# Patient Record
Sex: FEMALE | Race: OTHER | Employment: UNEMPLOYED | URBAN - METROPOLITAN AREA
[De-identification: names, ages, dates, MRNs, and addresses within clinical notes are randomized per-mention and may not be internally consistent; named-entity substitution may affect disease eponyms.]

---

## 2023-03-21 ENCOUNTER — TELEPHONE (OUTPATIENT)
Dept: ORTHOPEDIC SURGERY | Age: 33
End: 2023-03-21

## 2023-03-21 ENCOUNTER — HOSPITAL ENCOUNTER (EMERGENCY)
Age: 33
Discharge: HOME | End: 2023-03-21
Attending: EMERGENCY MEDICINE

## 2023-03-21 ENCOUNTER — APPOINTMENT (OUTPATIENT)
Dept: GENERAL RADIOLOGY | Age: 33
End: 2023-03-21

## 2023-03-21 VITALS
SYSTOLIC BLOOD PRESSURE: 113 MMHG | HEART RATE: 77 BPM | DIASTOLIC BLOOD PRESSURE: 70 MMHG | BODY MASS INDEX: 26.58 KG/M2 | RESPIRATION RATE: 16 BRPM | TEMPERATURE: 98.7 F | WEIGHT: 150 LBS | HEIGHT: 63 IN | OXYGEN SATURATION: 99 %

## 2023-03-21 DIAGNOSIS — S82.002A CLOSED NONDISPLACED FRACTURE OF LEFT PATELLA, UNSPECIFIED FRACTURE MORPHOLOGY, INITIAL ENCOUNTER: ICD-10-CM

## 2023-03-21 DIAGNOSIS — M25.00 LIPOHEMARTHROSIS: ICD-10-CM

## 2023-03-21 DIAGNOSIS — S83.005A CLOSED DISLOCATION OF LEFT PATELLA, INITIAL ENCOUNTER: Primary | ICD-10-CM

## 2023-03-21 PROCEDURE — 6360000002 HC RX W HCPCS: Performed by: STUDENT IN AN ORGANIZED HEALTH CARE EDUCATION/TRAINING PROGRAM

## 2023-03-21 PROCEDURE — 6370000000 HC RX 637 (ALT 250 FOR IP): Performed by: STUDENT IN AN ORGANIZED HEALTH CARE EDUCATION/TRAINING PROGRAM

## 2023-03-21 PROCEDURE — 73560 X-RAY EXAM OF KNEE 1 OR 2: CPT

## 2023-03-21 PROCEDURE — 96372 THER/PROPH/DIAG INJ SC/IM: CPT

## 2023-03-21 PROCEDURE — 73562 X-RAY EXAM OF KNEE 3: CPT

## 2023-03-21 PROCEDURE — 99284 EMERGENCY DEPT VISIT MOD MDM: CPT

## 2023-03-21 RX ORDER — MORPHINE SULFATE 4 MG/ML
4 INJECTION INTRAVENOUS ONCE
Status: COMPLETED | OUTPATIENT
Start: 2023-03-21 | End: 2023-03-21

## 2023-03-21 RX ORDER — TRAMADOL HYDROCHLORIDE 50 MG/1
50 TABLET ORAL EVERY 6 HOURS PRN
Qty: 12 TABLET | Refills: 0 | Status: SHIPPED | OUTPATIENT
Start: 2023-03-21 | End: 2023-03-24

## 2023-03-21 RX ORDER — ONDANSETRON 4 MG/1
4 TABLET, ORALLY DISINTEGRATING ORAL
Status: COMPLETED | OUTPATIENT
Start: 2023-03-21 | End: 2023-03-21

## 2023-03-21 RX ADMIN — MORPHINE SULFATE 4 MG: 4 INJECTION, SOLUTION INTRAMUSCULAR; INTRAVENOUS at 11:59

## 2023-03-21 RX ADMIN — ONDANSETRON 4 MG: 4 TABLET, ORALLY DISINTEGRATING ORAL at 12:00

## 2023-03-21 ASSESSMENT — ENCOUNTER SYMPTOMS
FACIAL SWELLING: 0
DIARRHEA: 0
CHEST TIGHTNESS: 0
WHEEZING: 0
PHOTOPHOBIA: 0
ABDOMINAL PAIN: 0
EYE REDNESS: 0
EYE PAIN: 0
RHINORRHEA: 0
SHORTNESS OF BREATH: 0
SORE THROAT: 0
APNEA: 0
VOICE CHANGE: 0
COUGH: 0
VOMITING: 0
TROUBLE SWALLOWING: 0
EYE DISCHARGE: 0

## 2023-03-21 ASSESSMENT — PAIN DESCRIPTION - ORIENTATION
ORIENTATION: LEFT
ORIENTATION: LEFT

## 2023-03-21 ASSESSMENT — PAIN SCALES - GENERAL
PAINLEVEL_OUTOF10: 10
PAINLEVEL_OUTOF10: 3

## 2023-03-21 ASSESSMENT — PAIN DESCRIPTION - LOCATION
LOCATION: KNEE
LOCATION: KNEE

## 2023-03-21 NOTE — ED PROVIDER NOTES
Medications    No medications on file        Results for orders placed or performed during the hospital encounter of 03/21/23   XR KNEE LEFT (3 VIEWS)    Narrative    Left knee    Clinical location: Left knee pain after a patella dislocation injury    COMPARISON: AP radiographs of the contralateral knee. FINDINGS: Three views of the left knee show superior lateral subluxation of  patella with respect to the trochlea. A fracture of the inferior medial corner  of the patella is evident. This would be an unusual location for a bipartite  patella. The distal femur, proximal tibia and fibula are intact. There is a  joint effusion with a fat fluid level concerning for lipohemarthrosis. Impression    1. Superior lateral subluxation of patella with respect to the trochlea with an  inferior medial corner patella fracture suspected. 2.  Joint effusion with a fat fluid level. This is concerning for a  lipohemarthrosis. XR Knee Bilateral Limited    Narrative    Left knee    Clinical location: Left knee pain after a patella dislocation injury    COMPARISON: AP radiographs of the contralateral knee. FINDINGS: Three views of the left knee show superior lateral subluxation of  patella with respect to the trochlea. A fracture of the inferior medial corner  of the patella is evident. This would be an unusual location for a bipartite  patella. The distal femur, proximal tibia and fibula are intact. There is a  joint effusion with a fat fluid level concerning for lipohemarthrosis. Impression    1. Superior lateral subluxation of patella with respect to the trochlea with an  inferior medial corner patella fracture suspected. 2.  Joint effusion with a fat fluid level. This is concerning for a  lipohemarthrosis. XR Knee Bilateral Limited   Final Result   1. Superior lateral subluxation of patella with respect to the trochlea with an   inferior medial corner patella fracture suspected.    2.  Joint

## 2023-03-21 NOTE — Clinical Note
Rajesh Galeana was seen and treated in our emergency department on 3/21/2023. She may return to work on 03/27/2023. Have this patient return on light duty until her knee heals. If you have any questions or concerns, please don't hesitate to call.       Levi Mcintyre

## 2023-03-21 NOTE — ED TRIAGE NOTES
Patient reports slipping and hearing a \"pop\" of left knee. Patient is not able to straighten  left leg at this time.

## 2023-03-21 NOTE — DISCHARGE INSTRUCTIONS
You dislocated your kneecap today. We were able to put it back into place. It does not appear you have any broken bones. Unfortunately, it is possible and likely that you tore your tendon as the knee keeps slipping out. You should follow-up with orthopedics Dr. Collin Horn. Call the number above. Return here for new or worsening symptoms. Risk of opioid analgesics include, but are not limited to: Overdosing can stop or slow your breathing and lead to death; fractures from falls; drowsiness leading to injury; tolerance, dependence and addiction. You should not operate any motorized vehicles or work from a height greater than ground level when taking opioid analgesics as they increase your fall risks. Do not combine these medications with any other opioid, to include street opioids such as heroin. Do not combine these medications with benzodiazepines like Xanax or alcohol as this may cause severe respiratory depression and death. As we discussed, I did not find a life threatening cause of your symptoms today. However, THAT DOES NOT MEAN IT COULD NOT DEVELOP. If you develop ANY new or worsening symptoms, it is critical that you return for re-evaluation. This includes any symptoms that are concerning to you, especially symptoms such as fever, numbness, inability to bear weight. If you do not return for re-evaluation, you risk serious complications, including death.

## 2023-03-21 NOTE — TELEPHONE ENCOUNTER
Pt was seen at [facility] ER for left patella dislocation. She was told to see Dr. Shanthi grimm. Can she be worked in this week?

## 2023-03-22 ENCOUNTER — OFFICE VISIT (OUTPATIENT)
Dept: ORTHOPEDIC SURGERY | Age: 33
End: 2023-03-22

## 2023-03-22 DIAGNOSIS — M25.562 ACUTE PAIN OF LEFT KNEE: Primary | ICD-10-CM

## 2023-03-22 DIAGNOSIS — M25.362 PATELLAR INSTABILITY OF LEFT KNEE: ICD-10-CM

## 2023-03-22 PROCEDURE — 99204 OFFICE O/P NEW MOD 45 MIN: CPT | Performed by: SPECIALIST/TECHNOLOGIST

## 2023-03-28 ENCOUNTER — OFFICE VISIT (OUTPATIENT)
Dept: ORTHOPEDIC SURGERY | Age: 33
End: 2023-03-28

## 2023-03-28 DIAGNOSIS — S82.092A OTHER CLOSED FRACTURE OF LEFT PATELLA, INITIAL ENCOUNTER: ICD-10-CM

## 2023-03-28 DIAGNOSIS — M25.362 PATELLAR INSTABILITY OF LEFT KNEE: Primary | ICD-10-CM

## 2023-03-28 NOTE — H&P (VIEW-ONLY)
possibility of recurrent patellar instability. We discussed operative intervention which would include knee arthroscopy possible lateral release vs open lateral lengthening and MPFL repair vs reconstruction with hamstring autograft. We also discussed treatment of any intra-articular or chondral pathology at the time of surgery as indicated. We also discussed the risks of the procedure including but not limited to infection, bleeding, anesthetic complications postoperative DVT/PE, possible need for further surgery and expected recovery of 5 to 6 months. All of their questions have been answered and they elect to proceed as planned               Levon Garcia MD, [address] [place] [address] and Sports Medicine

## 2023-03-28 NOTE — PROGRESS NOTES
The brace was properly aligned medially and laterally along the length of the left Knee with the extension/flexion dials placed slightly above the patella (to insure that if brace slides down slightly the dials will still line up on either side of the patella/knee region). The brace is extended/shorten to the patient's leg length and to insure proper fit of the brace. The straps are tightened starting with the most distal strap and then strap proximal to the patella. The strap right below the patella is then secured and last is the strap highest on the quad. The straps are then trimmed for easier tightening of the brace for the patient. Patient read and signed documenting they understand and agree to [facility]'s current DME return policy.
easily  CV: regular rhythm by pulse  Abd: soft  Left Knee: Large effusion present. Tenderness to palpation inferior pole of patella. Passive flexion to about 30 degrees with full extension. Unable to perform a straight leg raise. Positive patellar apprehension test.  Tenderness palpation over the medial aspect of the patella. Imaging:   I reviewed injury radiographs as well as postreduction plain radiographs in the emergency room which show lateral patellar dislocation with reduction there is an inferior medial pole patella fracture which appears to be displaced. Also reviewed an MRI scan of her knee which demonstrates a displaced inferior medial patella fracture with persistent lateral subluxation and increased lateral tilt of the patella. 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 Large effusion. All imaging interpreted by myself Levon Hernandez MD independent of radiology review        Assessment:     ICD-10-CM    1. Patellar instability of left knee  M25.362 [facility] [place] [address] [place] [address]     Ambulatory referral to Orthopedic Surgery     Ambulatory referral to Physical Therapy      2. Other closed fracture of left patella, initial encounter  S82.092A           Plan:   Traumatic patellar instability with a fracture and persistent subluxation. Unfortunately I think my recommendation would be for surgical intervention. We discussed potential excision versus ORIF of the patella fracture depending on the location and how much articular cartilage is involved. We also discussed an MPFL repair with internal brace versus potential reconstruction with hamstring autograft. As well as lateral lengthening. We discussed her anatomy and my concern with her trochlear dysplasia we discussed potential trochlear plasty in the future I would not do this in the acute setting.   We discussed the postoperative course associated with this and the risk and benefits including the

## 2023-03-29 ENCOUNTER — ANESTHESIA EVENT (OUTPATIENT)
Dept: SURGERY | Age: 33
End: 2023-03-29

## 2023-03-29 DIAGNOSIS — M25.562 ACUTE PAIN OF LEFT KNEE: ICD-10-CM

## 2023-03-29 DIAGNOSIS — M25.362 PATELLAR INSTABILITY OF LEFT KNEE: Primary | ICD-10-CM

## 2023-03-29 DIAGNOSIS — S82.092A OTHER CLOSED FRACTURE OF LEFT PATELLA, INITIAL ENCOUNTER: ICD-10-CM

## 2023-03-29 RX ORDER — CHLORAL HYDRATE 500 MG
CAPSULE ORAL DAILY
COMMUNITY

## 2023-03-29 RX ORDER — TRAMADOL HYDROCHLORIDE 50 MG/1
50 TABLET ORAL EVERY 6 HOURS PRN
COMMUNITY

## 2023-03-29 RX ORDER — OXYCODONE HYDROCHLORIDE 5 MG/1
5 TABLET ORAL EVERY 4 HOURS PRN
Qty: 30 TABLET | Refills: 0 | Status: SHIPPED | OUTPATIENT
Start: 2023-03-29 | End: 2023-04-03

## 2023-03-29 RX ORDER — SODIUM CHLORIDE 9 MG/ML
INJECTION, SOLUTION INTRAVENOUS PRN
Status: CANCELLED | OUTPATIENT
Start: 2023-03-29

## 2023-03-29 NOTE — PERIOP NOTE
Patient verified name and . Order for consent not found in EHR and matches case posting; patient verifies procedure. Type II surgery, phone assessment complete. Orders not received. Labs per surgeon: none  Labs per anesthesia protocol: hgb (s/h for DOS)    Patient answered medical/surgical history questions at their best of ability. All prior to admission medications documented in EPIC. Patient instructed to take the following medications the day of surgery according to anesthesia guidelines with a small sip of water: Tramadol (Ultram) if needed  On the day before surgery please take Acetaminophen 1000mg in the morning and then again before bed. You may substitute for Tylenol 650 mg. Hold all vitamins 7 days prior to surgery and NSAIDS 5 days prior to surgery. Prescription meds to hold: none  Patient instructed on the following:    > Arrive at [address] [facility], time of arrival to be called the day before by 1700  > NPO after midnight, unless otherwise indicated, including gum, mints, and ice chips  > Responsible adult must drive patient to the hospital, stay during surgery, and patient will need supervision 24 hours after anesthesia  > Use antibacterial soap in shower the night before surgery and on the morning of surgery  > All piercings must be removed prior to arrival.    > Leave all valuables (money and jewelry) at home but bring insurance card and ID on DOS.   > You may be required to pay a deductible or co-pay on the day of your procedure. You can pre-pay by calling [phone] if your surgery is at the [facility] or [phone] if your surgery is at the [facility]. > Do not wear make-up, nail polish, lotions, cologne, perfumes, powders, or oil on skin. Artificial nails are not permitted.

## 2023-03-29 NOTE — PERIOP NOTE
Preop department called to notify patient of arrival time for scheduled procedure. Instructions given to   - Arrive at [address]. - Remain NPO after midnight, unless otherwise indicated, including gum, mints, and ice chips. - Have a responsible adult to drive patient to the hospital, stay during surgery, and patient will need supervision 24 hours after anesthesia. - Use antibacterial soap in shower the night before surgery and on the morning of surgery.        Was patient contacted: margarito  Voicemail left:   Numbers contacted: [phone]   Arrival time: 1000

## 2023-03-30 ENCOUNTER — OFFICE VISIT (OUTPATIENT)
Dept: ORTHOPEDIC SURGERY | Age: 33
End: 2023-03-30

## 2023-03-30 ENCOUNTER — HOSPITAL ENCOUNTER (OUTPATIENT)
Age: 33
Setting detail: OUTPATIENT SURGERY
Discharge: HOME | End: 2023-03-30
Attending: ORTHOPAEDIC SURGERY | Admitting: ORTHOPAEDIC SURGERY

## 2023-03-30 ENCOUNTER — ANESTHESIA (OUTPATIENT)
Dept: SURGERY | Age: 33
End: 2023-03-30

## 2023-03-30 ENCOUNTER — APPOINTMENT (OUTPATIENT)
Dept: GENERAL RADIOLOGY | Age: 33
End: 2023-03-30
Attending: ORTHOPAEDIC SURGERY

## 2023-03-30 VITALS
TEMPERATURE: 98.2 F | OXYGEN SATURATION: 96 % | WEIGHT: 150 LBS | HEIGHT: 63 IN | RESPIRATION RATE: 16 BRPM | SYSTOLIC BLOOD PRESSURE: 151 MMHG | DIASTOLIC BLOOD PRESSURE: 73 MMHG | BODY MASS INDEX: 26.58 KG/M2 | HEART RATE: 92 BPM

## 2023-03-30 DIAGNOSIS — M25.362 PATELLAR INSTABILITY OF LEFT KNEE: ICD-10-CM

## 2023-03-30 DIAGNOSIS — M25.362 PATELLAR INSTABILITY OF LEFT KNEE: Primary | ICD-10-CM

## 2023-03-30 DIAGNOSIS — M25.562 ACUTE PAIN OF LEFT KNEE: ICD-10-CM

## 2023-03-30 LAB
HCG UR QL: NEGATIVE
HCG UR QL: NEGATIVE

## 2023-03-30 PROCEDURE — C1713 ANCHOR/SCREW BN/BN,TIS/BN: HCPCS | Performed by: ORTHOPAEDIC SURGERY

## 2023-03-30 PROCEDURE — 7100000000 HC PACU RECOVERY - FIRST 15 MIN: Performed by: ORTHOPAEDIC SURGERY

## 2023-03-30 PROCEDURE — A4216 STERILE WATER/SALINE, 10 ML: HCPCS | Performed by: ORTHOPAEDIC SURGERY

## 2023-03-30 PROCEDURE — 2580000003 HC RX 258: Performed by: ANESTHESIOLOGY

## 2023-03-30 PROCEDURE — 6360000002 HC RX W HCPCS: Performed by: REGISTERED NURSE

## 2023-03-30 PROCEDURE — 6360000002 HC RX W HCPCS: Performed by: ANESTHESIOLOGY

## 2023-03-30 PROCEDURE — 6370000000 HC RX 637 (ALT 250 FOR IP): Performed by: ANESTHESIOLOGY

## 2023-03-30 PROCEDURE — 2500000003 HC RX 250 WO HCPCS: Performed by: REGISTERED NURSE

## 2023-03-30 PROCEDURE — 7100000010 HC PHASE II RECOVERY - FIRST 15 MIN: Performed by: ORTHOPAEDIC SURGERY

## 2023-03-30 PROCEDURE — 2709999900 HC NON-CHARGEABLE SUPPLY: Performed by: ORTHOPAEDIC SURGERY

## 2023-03-30 PROCEDURE — 3700000001 HC ADD 15 MINUTES (ANESTHESIA): Performed by: ORTHOPAEDIC SURGERY

## 2023-03-30 PROCEDURE — 6360000002 HC RX W HCPCS: Performed by: SPECIALIST/TECHNOLOGIST

## 2023-03-30 PROCEDURE — 3600000014 HC SURGERY LEVEL 4 ADDTL 15MIN: Performed by: ORTHOPAEDIC SURGERY

## 2023-03-30 PROCEDURE — 27524 TREAT KNEECAP FRACTURE: CPT | Performed by: ORTHOPAEDIC SURGERY

## 2023-03-30 PROCEDURE — 6360000002 HC RX W HCPCS: Performed by: ORTHOPAEDIC SURGERY

## 2023-03-30 PROCEDURE — 7100000011 HC PHASE II RECOVERY - ADDTL 15 MIN: Performed by: ORTHOPAEDIC SURGERY

## 2023-03-30 PROCEDURE — 3700000000 HC ANESTHESIA ATTENDED CARE: Performed by: ORTHOPAEDIC SURGERY

## 2023-03-30 PROCEDURE — 3600000004 HC SURGERY LEVEL 4 BASE: Performed by: ORTHOPAEDIC SURGERY

## 2023-03-30 PROCEDURE — 2720000010 HC SURG SUPPLY STERILE: Performed by: ORTHOPAEDIC SURGERY

## 2023-03-30 PROCEDURE — 27425 LAT RETINACULAR RELEASE OPEN: CPT | Performed by: ORTHOPAEDIC SURGERY

## 2023-03-30 PROCEDURE — 81025 URINE PREGNANCY TEST: CPT

## 2023-03-30 PROCEDURE — 7100000001 HC PACU RECOVERY - ADDTL 15 MIN: Performed by: ORTHOPAEDIC SURGERY

## 2023-03-30 PROCEDURE — 2580000003 HC RX 258: Performed by: ORTHOPAEDIC SURGERY

## 2023-03-30 PROCEDURE — 27420 REVISION OF UNSTABLE KNEECAP: CPT | Performed by: ORTHOPAEDIC SURGERY

## 2023-03-30 DEVICE — ANCHOR SFT TISS BICEPS FIBERTAK: Type: IMPLANTABLE DEVICE | Site: KNEE | Status: FUNCTIONAL

## 2023-03-30 DEVICE — SCREW INTRF L23MM DIA6MM 30% BIPHASIC CA PHOS AND 70% PLDLA: Type: IMPLANTABLE DEVICE | Site: KNEE | Status: FUNCTIONAL

## 2023-03-30 RX ORDER — SODIUM CHLORIDE 0.9 % (FLUSH) 0.9 %
5-40 SYRINGE (ML) INJECTION EVERY 12 HOURS SCHEDULED
Status: DISCONTINUED | OUTPATIENT
Start: 2023-03-30 | End: 2023-03-30 | Stop reason: HOSPADM

## 2023-03-30 RX ORDER — SODIUM CHLORIDE 9 MG/ML
INJECTION INTRAVENOUS PRN
Status: DISCONTINUED | OUTPATIENT
Start: 2023-03-30 | End: 2023-03-30 | Stop reason: HOSPADM

## 2023-03-30 RX ORDER — EPINEPHRINE 1 MG/ML(1)
AMPUL (ML) INJECTION PRN
Status: DISCONTINUED | OUTPATIENT
Start: 2023-03-30 | End: 2023-03-30 | Stop reason: HOSPADM

## 2023-03-30 RX ORDER — ROCURONIUM BROMIDE 10 MG/ML
INJECTION, SOLUTION INTRAVENOUS PRN
Status: DISCONTINUED | OUTPATIENT
Start: 2023-03-30 | End: 2023-03-30 | Stop reason: SDUPTHER

## 2023-03-30 RX ORDER — ONDANSETRON 2 MG/ML
INJECTION INTRAMUSCULAR; INTRAVENOUS PRN
Status: DISCONTINUED | OUTPATIENT
Start: 2023-03-30 | End: 2023-03-30 | Stop reason: SDUPTHER

## 2023-03-30 RX ORDER — ONDANSETRON 2 MG/ML
4 INJECTION INTRAMUSCULAR; INTRAVENOUS
Status: DISCONTINUED | OUTPATIENT
Start: 2023-03-30 | End: 2023-03-30 | Stop reason: HOSPADM

## 2023-03-30 RX ORDER — SODIUM CHLORIDE 0.9 % (FLUSH) 0.9 %
5-40 SYRINGE (ML) INJECTION PRN
Status: DISCONTINUED | OUTPATIENT
Start: 2023-03-30 | End: 2023-03-30 | Stop reason: HOSPADM

## 2023-03-30 RX ORDER — OXYCODONE HYDROCHLORIDE 5 MG/1
5 TABLET ORAL
Status: DISCONTINUED | OUTPATIENT
Start: 2023-03-30 | End: 2023-03-30 | Stop reason: HOSPADM

## 2023-03-30 RX ORDER — NEOSTIGMINE METHYLSULFATE 1 MG/ML
INJECTION, SOLUTION INTRAVENOUS PRN
Status: DISCONTINUED | OUTPATIENT
Start: 2023-03-30 | End: 2023-03-30 | Stop reason: SDUPTHER

## 2023-03-30 RX ORDER — HYDROMORPHONE HYDROCHLORIDE 2 MG/ML
0.5 INJECTION, SOLUTION INTRAMUSCULAR; INTRAVENOUS; SUBCUTANEOUS EVERY 5 MIN PRN
Status: DISCONTINUED | OUTPATIENT
Start: 2023-03-30 | End: 2023-03-30 | Stop reason: HOSPADM

## 2023-03-30 RX ORDER — HYDROMORPHONE HCL 110MG/55ML
PATIENT CONTROLLED ANALGESIA SYRINGE INTRAVENOUS PRN
Status: DISCONTINUED | OUTPATIENT
Start: 2023-03-30 | End: 2023-03-30 | Stop reason: SDUPTHER

## 2023-03-30 RX ORDER — KETAMINE HCL IN NACL, ISO-OSM 20 MG/2 ML
SYRINGE (ML) INJECTION PRN
Status: DISCONTINUED | OUTPATIENT
Start: 2023-03-30 | End: 2023-03-30 | Stop reason: SDUPTHER

## 2023-03-30 RX ORDER — KETOROLAC TROMETHAMINE 30 MG/ML
INJECTION, SOLUTION INTRAMUSCULAR; INTRAVENOUS PRN
Status: DISCONTINUED | OUTPATIENT
Start: 2023-03-30 | End: 2023-03-30 | Stop reason: HOSPADM

## 2023-03-30 RX ORDER — SODIUM CHLORIDE, SODIUM LACTATE, POTASSIUM CHLORIDE, CALCIUM CHLORIDE 600; 310; 30; 20 MG/100ML; MG/100ML; MG/100ML; MG/100ML
INJECTION, SOLUTION INTRAVENOUS CONTINUOUS
Status: DISCONTINUED | OUTPATIENT
Start: 2023-03-30 | End: 2023-03-30 | Stop reason: HOSPADM

## 2023-03-30 RX ORDER — PROPOFOL 10 MG/ML
INJECTION, EMULSION INTRAVENOUS PRN
Status: DISCONTINUED | OUTPATIENT
Start: 2023-03-30 | End: 2023-03-30 | Stop reason: SDUPTHER

## 2023-03-30 RX ORDER — ROPIVACAINE HYDROCHLORIDE 5 MG/ML
INJECTION, SOLUTION EPIDURAL; INFILTRATION; PERINEURAL PRN
Status: DISCONTINUED | OUTPATIENT
Start: 2023-03-30 | End: 2023-03-30 | Stop reason: HOSPADM

## 2023-03-30 RX ORDER — LIDOCAINE HYDROCHLORIDE 20 MG/ML
INJECTION, SOLUTION EPIDURAL; INFILTRATION; INTRACAUDAL; PERINEURAL PRN
Status: DISCONTINUED | OUTPATIENT
Start: 2023-03-30 | End: 2023-03-30 | Stop reason: SDUPTHER

## 2023-03-30 RX ORDER — FENTANYL CITRATE 50 UG/ML
100 INJECTION, SOLUTION INTRAMUSCULAR; INTRAVENOUS
Status: DISCONTINUED | OUTPATIENT
Start: 2023-03-30 | End: 2023-03-30 | Stop reason: HOSPADM

## 2023-03-30 RX ORDER — TRANEXAMIC ACID 100 MG/ML
INJECTION, SOLUTION INTRAVENOUS PRN
Status: DISCONTINUED | OUTPATIENT
Start: 2023-03-30 | End: 2023-03-30 | Stop reason: SDUPTHER

## 2023-03-30 RX ORDER — ACETAMINOPHEN 500 MG
1000 TABLET ORAL ONCE
Status: COMPLETED | OUTPATIENT
Start: 2023-03-30 | End: 2023-03-30

## 2023-03-30 RX ORDER — MIDAZOLAM HYDROCHLORIDE 1 MG/ML
INJECTION INTRAMUSCULAR; INTRAVENOUS PRN
Status: DISCONTINUED | OUTPATIENT
Start: 2023-03-30 | End: 2023-03-30 | Stop reason: SDUPTHER

## 2023-03-30 RX ORDER — MIDAZOLAM HYDROCHLORIDE 2 MG/2ML
2 INJECTION, SOLUTION INTRAMUSCULAR; INTRAVENOUS
Status: DISCONTINUED | OUTPATIENT
Start: 2023-03-30 | End: 2023-03-30 | Stop reason: HOSPADM

## 2023-03-30 RX ORDER — DIPHENHYDRAMINE HYDROCHLORIDE 50 MG/ML
12.5 INJECTION INTRAMUSCULAR; INTRAVENOUS
Status: DISCONTINUED | OUTPATIENT
Start: 2023-03-30 | End: 2023-03-30 | Stop reason: HOSPADM

## 2023-03-30 RX ORDER — GLYCOPYRROLATE 0.2 MG/ML
INJECTION INTRAMUSCULAR; INTRAVENOUS PRN
Status: DISCONTINUED | OUTPATIENT
Start: 2023-03-30 | End: 2023-03-30 | Stop reason: SDUPTHER

## 2023-03-30 RX ORDER — DEXAMETHASONE SODIUM PHOSPHATE 4 MG/ML
INJECTION, SOLUTION INTRA-ARTICULAR; INTRALESIONAL; INTRAMUSCULAR; INTRAVENOUS; SOFT TISSUE PRN
Status: DISCONTINUED | OUTPATIENT
Start: 2023-03-30 | End: 2023-03-30 | Stop reason: SDUPTHER

## 2023-03-30 RX ADMIN — Medication 3 MG: at 14:37

## 2023-03-30 RX ADMIN — TRANEXAMIC ACID 1000 MG: 100 INJECTION, SOLUTION INTRAVENOUS at 12:40

## 2023-03-30 RX ADMIN — HYDROMORPHONE HYDROCHLORIDE 0.6 MG: 2 INJECTION INTRAMUSCULAR; INTRAVENOUS; SUBCUTANEOUS at 12:54

## 2023-03-30 RX ADMIN — Medication 2 G: at 12:30

## 2023-03-30 RX ADMIN — Medication 30 MG: at 12:32

## 2023-03-30 RX ADMIN — ACETAMINOPHEN 1000 MG: 500 TABLET, FILM COATED ORAL at 10:41

## 2023-03-30 RX ADMIN — ONDANSETRON 4 MG: 2 INJECTION INTRAMUSCULAR; INTRAVENOUS at 12:56

## 2023-03-30 RX ADMIN — LIDOCAINE HYDROCHLORIDE 60 MG: 20 INJECTION, SOLUTION EPIDURAL; INFILTRATION; INTRACAUDAL; PERINEURAL at 12:22

## 2023-03-30 RX ADMIN — MIDAZOLAM 2 MG: 1 INJECTION INTRAMUSCULAR; INTRAVENOUS at 12:13

## 2023-03-30 RX ADMIN — FENTANYL CITRATE 50 MCG: 50 INJECTION INTRAMUSCULAR; INTRAVENOUS at 12:22

## 2023-03-30 RX ADMIN — HYDROMORPHONE HYDROCHLORIDE 0.4 MG: 2 INJECTION INTRAMUSCULAR; INTRAVENOUS; SUBCUTANEOUS at 13:12

## 2023-03-30 RX ADMIN — PROPOFOL 200 MG: 10 INJECTION, EMULSION INTRAVENOUS at 12:22

## 2023-03-30 RX ADMIN — ROCURONIUM BROMIDE 50 MG: 50 INJECTION, SOLUTION INTRAVENOUS at 12:22

## 2023-03-30 RX ADMIN — SODIUM CHLORIDE, SODIUM LACTATE, POTASSIUM CHLORIDE, AND CALCIUM CHLORIDE: 600; 310; 30; 20 INJECTION, SOLUTION INTRAVENOUS at 13:00

## 2023-03-30 RX ADMIN — DEXAMETHASONE SODIUM PHOSPHATE 4 MG: 4 INJECTION, SOLUTION INTRAMUSCULAR; INTRAVENOUS at 12:56

## 2023-03-30 RX ADMIN — Medication 15 MG: at 13:30

## 2023-03-30 RX ADMIN — SODIUM CHLORIDE, SODIUM LACTATE, POTASSIUM CHLORIDE, AND CALCIUM CHLORIDE: 600; 310; 30; 20 INJECTION, SOLUTION INTRAVENOUS at 10:41

## 2023-03-30 RX ADMIN — GLYCOPYRROLATE 0.4 MG: 0.2 INJECTION INTRAMUSCULAR; INTRAVENOUS at 14:37

## 2023-03-30 RX ADMIN — FENTANYL CITRATE 50 MCG: 50 INJECTION INTRAMUSCULAR; INTRAVENOUS at 12:32

## 2023-03-30 RX ADMIN — HYDROMORPHONE HYDROCHLORIDE 0.5 MG: 2 INJECTION, SOLUTION INTRAMUSCULAR; INTRAVENOUS; SUBCUTANEOUS at 15:20

## 2023-03-30 ASSESSMENT — PAIN DESCRIPTION - LOCATION
LOCATION: LEG
LOCATION: LEG

## 2023-03-30 ASSESSMENT — PAIN DESCRIPTION - DESCRIPTORS
DESCRIPTORS: ACHING
DESCRIPTORS: ACHING

## 2023-03-30 ASSESSMENT — PAIN DESCRIPTION - ORIENTATION
ORIENTATION: LEFT
ORIENTATION: LEFT

## 2023-03-30 ASSESSMENT — PAIN SCALES - GENERAL
PAINLEVEL_OUTOF10: 5
PAINLEVEL_OUTOF10: 7

## 2023-03-30 NOTE — DISCHARGE INSTRUCTIONS
showering. Keep bandage in place. EXERCISES: Continue Quad sets and ankle pumps as above. ICE: Continue to use the ice machine frequently as instructed above. Place a thin layer of clothing or pillow case between ice pack and your skin. Ice for 20-30 minutes on and then 20-30 minutes off. Third Post-Op Day:    MEDICATION:  Continue as instructed above. BANDAGES:   (after 72 hours/3 days): Remove outer bandages. Leave steri-strips in place. You may shower, but keep the incisions as dry as possible (cover with plastic wrap or a garbage bag over the leg). It is best to sit down in the shower to avoid slipping. Replace brace after removing the outer dressing. EXERCISES:    Continue exercises as noted above. Begin flat foot weight bearing (place foot flat on the ground and bear the weight of your leg only). The brace must be locked in extension (straight). ICE:   Continue to ice frequently eight with the ice machine or regular ice pack. Do not put it directly on your skin. Place a thin layer of clothing or pillow case between ice pack and your skin. Ice for 20-30 minutes on and then 20-30 minutes off. If you are awake and comfortable it is ok to use the ice machine more than 30 minutes at a time as long as you ensure it is not burning your skin. PHYSICAL THERAPY APPOINTMENT:  If you do not already have a PT appointment scheduled, please make an appointment for 3-7 days post-op. GENERAL INFORMATION:     KNEE RESPONSE TO SURGERY:  -Your knee and lower leg will be swollen.  -It may take 4 weeks or longer for the swelling to go away. -It is also common to notice bruising around the thigh, knee and calf. Call with any problems or questions. [phone] if you have any questions or problems. Please contact us immediately if you notice fever greater than 101 degrees F, excessive bleeding, or drainage from the surgery site, calf pain, or shortness of breath.    If you are calling tobacco products/ Avoid exposure to second hand smoke    Surgeon General's Warning:  Quitting smoking now greatly reduces serious risk to your health. Obesity, smoking, and sedentary lifestyle greatly increases your risk for illness    A healthy diet, regular physical exercise & weight monitoring are important for maintaining a healthy lifestyle    You may be retaining fluid if you have a history of heart failure or if you experience any of the following symptoms:  Weight gain of 3 pounds or more overnight or 5 pounds in a week, increased swelling in our hands or feet or shortness of breath while lying flat in bed. Please call your doctor as soon as you notice any of these symptoms; do not wait until your next office visit. Recognize signs and symptoms of STROKE:    F-face looks uneven    A-arms unable to move or move unevenly    S-speech slurred or non-existent    T-time-call 911 as soon as signs and symptoms begin-DO NOT go       Back to bed or wait to see if you get better-TIME IS BRAIN.

## 2023-03-30 NOTE — INTERVAL H&P NOTE
Update History & Physical    The Patient's History and Physical was reviewed   I discussed the surgery and patients medical condition with the patient. The chart was reviewed with the patient and I examined the patient. There was no change from previous note. The surgical site was confirmed by the patient and me. CV: RRR  RESP: CTAB    Plan:  The risk, benefits, expected outcome, and alternative to the recommended procedure have been discussed with the patient. Patient understands and elects to proceed with the procedure as planned.     Electronically signed by Lorraine Harkins MD on 03/30/23 12:05 PM

## 2023-03-30 NOTE — PROGRESS NOTES
's pre-procedure visit requested by patient. Conveyed care and concern for patient and family. Offered prayer as requested for patient, family, and staff.     Bernice Mesa MDiv, BS  Board Certified

## 2023-03-30 NOTE — ANESTHESIA PROCEDURE NOTES
Airway  Date/Time: 3/30/2023 12:26 PM  Urgency: elective    Airway not difficult    General Information and Staff    Patient location during procedure: OR  Resident/CRNA: MAGGI Alfredo CRNA  Performed: resident/CRNA     Indications and Patient Condition  Indications for airway management: anesthesia  Spontaneous Ventilation: absent  Sedation level: deep  Preoxygenated: yes  Patient position: sniffing  Mask difficulty assessment: vent by bag mask    Final Airway Details  Final airway type: endotracheal airway      Successful airway: ETT  Cuffed: yes   Successful intubation technique: direct laryngoscopy  Facilitating devices/methods: intubating stylet  Endotracheal tube insertion site: oral  Blade: Sanjay  Blade size: #4  ETT size (mm): 7.0  Cormack-Lehane Classification: grade I - full view of glottis  Placement verified by: chest auscultation and capnometry   Measured from: lips  ETT to lips (cm): 22  Number of attempts at approach: 1  Ventilation between attempts: bag mask  Number of other approaches attempted: 0    Additional Comments  PreO2 > 5 minutes. Standard IV induction by MDA. Atraumatic insertion. Positive equal and bilateral breath sounds noted with positive ETCO2 present. Lips and dentition unchanged from pre-op.     no

## 2023-03-30 NOTE — OP NOTE
postoperative pain control. Steri-Strips large bulky sterile dressings and a hinged knee brace and cryotherapy device were applied.    Carli tolerated the procedure well was awakened and transferred to the PACU in stable condition        Postoperative plan:  1) Discharge Home  2) DVT prophylaxis with aspirin 81 mg twice daily x3 weeks  3) Rehab protocol: Routine MPFL protocol, motion as tolerated, gradual progression of weightbearing as tolerated in extension    Signed By:  Jessica Wren MD     March 30, 2023

## 2023-03-30 NOTE — ANESTHESIA PRE PROCEDURE
Department of Anesthesiology  Preprocedure Note       Name:  Noelle Mathias   Age:  28 y.o.  :  1990                                          MRN:  [id number]         Date:  3/30/2023      Surgeon: Rob King):  Jaquelin Hwang MD    Procedure: Procedure(s):  LEFT KNEE MEDIAL PATELLOFEMORAL LIGAMENT RECONSTRUCTION HAMSTRING AUTOGRAFT AND OPEN REDUCTION AND INTERNAL FIXATION PATELLA    Medications prior to admission:   Prior to Admission medications    Medication Sig Start Date End Date Taking? Authorizing Provider   COLLAGEN PO Take by mouth daily 1 collagen gummy daily   Yes Historical Provider, MD   traMADol (ULTRAM) 50 MG tablet Take 50 mg by mouth every 6 hours as needed for Pain. Yes Historical Provider, MD   Omega-3 Fatty Acids (FISH OIL) 1000 MG capsule Take by mouth daily   Yes Historical Provider, MD   CHLOROPHYLL PO Take by mouth Chlorophyll liquid gtts added to 16 oz of water   Yes Historical Provider, MD   oxyCODONE (ROXICODONE) 5 MG immediate release tablet Take 1 tablet by mouth every 4 hours as needed for Pain for up to 5 days. Intended supply: 5 days.  Take lowest dose possible to manage pain Max Daily Amount: 30 mg 3/29/23 4/3/23  MAGGI Morgan CNP       Current medications:    Current Facility-Administered Medications   Medication Dose Route Frequency Provider Last Rate Last Admin    sodium chloride flush 0.9 % injection 5-40 mL  5-40 mL IntraVENous 2 times per day MAGGI Morgan CNP        sodium chloride flush 0.9 % injection 5-40 mL  5-40 mL IntraVENous PRN MAGGI Morgan CNP        ceFAZolin (ANCEF) 2000 mg in sterile water 20 mL IV syringe  2,000 mg IntraVENous On Call to [address] [place] [address], APRN - CNP        fentaNYL (SUBLIMAZE) injection 100 mcg  100 mcg IntraVENous Once PRN Harsh Fontenot MD        lactated ringers IV soln infusion   IntraVENous Continuous Harsh Fontenot  mL/hr at 23 1041 New Bag at 23 1041    sodium chloride flush

## 2023-03-30 NOTE — PROGRESS NOTES
I explained and demonstrated all information to the patient about how to properly use the machine. It will be used to help reduce pain and swelling, in turn facilitate healing and speed up the rehabilitation process. The patient was instructed on how to properly fill the machine with ice and water as well as the importance to ALWAYS use a barrier between your skin and the cold pad to avoid additional injury. The patient was instructed to take the machine to the hospital with them the morning of their surgery. Patient was advised that if they had any questions about the Ice Man Machine or how to properly use it to please call me at [phone]. Patient read and signed documenting they understand and agree to [facility]'s current DME return policy.

## 2023-03-31 NOTE — ANESTHESIA POSTPROCEDURE EVALUATION
Department of Anesthesiology  Postprocedure Note    Patient: Jim English  MRN: [id number]  YOB: 1990  Date of evaluation: 3/31/2023      Procedure Summary     Date: 03/30/23 Room / Location:  OP OR 03 / [facility]    Anesthesia Start: 1212 Anesthesia Stop: 0983    Procedure: LEFT KNEE MEDIAL PATELLOFEMORAL LIGAMENT RECONSTRUCTION HAMSTRING AUTOGRAFT AND PARTIAL PATELLECTOMY/ LATERAL RETINACULAR LENGTHENING (Left: Knee) Diagnosis:       Patellar instability of left knee      Acute pain of left knee      (Patellar instability of left knee [M25.362])      (Acute pain of left knee [M25.562])    Surgeons: Justin Nino MD Responsible Provider: Hemant Mulligan MD    Anesthesia Type: General ASA Status: 2          Anesthesia Type: General    Brent Phase I: Brent Score: 10    Brent Phase II: Brent Score: 10      Anesthesia Post Evaluation    Patient location during evaluation: PACU  Patient participation: complete - patient participated  Level of consciousness: awake and alert  Airway patency: patent  Nausea & Vomiting: no nausea and no vomiting  Complications: no  Cardiovascular status: hemodynamically stable  Respiratory status: acceptable, nonlabored ventilation and spontaneous ventilation  Hydration status: euvolemic  Comments: BP (!) 151/73   Pulse 92   Temp 98.2 °F (36.8 °C) (Skin)   Resp 16   Ht 5' 3\" (1.6 m)   Wt 150 lb (68 kg)   SpO2 96%   BMI 26.57 kg/m²     Multimodal analgesia pain management approach

## 2023-04-03 ENCOUNTER — OFFICE VISIT (OUTPATIENT)
Age: 33
End: 2023-04-03

## 2023-04-03 DIAGNOSIS — Z74.09 IMPAIRED FUNCTIONAL MOBILITY, BALANCE, GAIT, AND ENDURANCE: ICD-10-CM

## 2023-04-03 DIAGNOSIS — M25.362 PATELLAR INSTABILITY OF LEFT KNEE: ICD-10-CM

## 2023-04-03 DIAGNOSIS — M25.462 EFFUSION OF LEFT KNEE: ICD-10-CM

## 2023-04-03 DIAGNOSIS — M62.81 MUSCLE WEAKNESS: ICD-10-CM

## 2023-04-03 DIAGNOSIS — M25.662 KNEE STIFFNESS, LEFT: ICD-10-CM

## 2023-04-03 DIAGNOSIS — M25.562 ACUTE PAIN OF LEFT KNEE: Primary | ICD-10-CM

## 2023-04-03 NOTE — PROGRESS NOTES
is a 28 y.o. female who presents to therapy today with evolving/changing clinical presentation (moderate complexity)  related to L knee pain, stiffness, and marked weakness s/p MPFL repair. Pt would benefit from skilled physical therapy services to address the deficits noted above for return to prior level of function. PLAN OF CARE     Effective Dates: 4/3/2023 TO 7/2/2023 (90 days). Frequency/Duration: 2x/week for 90 Day(s)  Interventions may include but are not limited to: (00096) Therapeutic exercise to develop ROM, strength, endurance and flexibility  (53608) Therapeutic activities using dynamic activities to improve function  (89905) Gait training to address mechanics, proper step length and weight shifting to improve household and community mobility as well as overall safety with ADLs  (84891) Manual therapy techniques to improve joint and/or soft tissue mobility, ROM, and function as well as helping to decrease pain/spasms and swelling  (22117) Neuromuscular reeducation addressing impaired balance, coordination, kinesthetic sense, posture and proprioception  Home exercise program (HEP) development  Modalities prn to address pain, spasms, and swelling: (36720) Electrical stimulation - attended  (47205/) Electrical stimulation- unattended  (59336) Vasopneumatic compression  (27594) Hot/cold pack    The referring physician has reviewed and approved this evaluation and plan of care as noted by the electronic signature attached to note. GOALS     Goals:  Short term goals to be met by 5/1/2023  (4 weeks):  Pt will demonstrate good recall of HEP requiring minimal verbal cuing for proper form and technique. Pt will decrease swelling at involved LE/knee by 1 cm at mid-patella, which contributes to greater ROM and less pain with ADLs. Increase knee AROM of involved LE to 8-0-120 degrees, in order to sit more comfortably in a low chair.   Pt will improve quad set to fair for improved knee stability with

## 2023-04-05 NOTE — PROGRESS NOTES
dislocation 3/21/23. The ER had difficulty keeping the patella reduced. She was placed in a knee immobilizer, non-weight bearing with crutches and followed up with POA. MRI completed with results as noted above and pt scheduled surgery. Underwent surgery as noted above 2/48/65 with no complications to date. Describe current symptoms: Pain entire L knee, mainly concentrated in the front. Swelling L knee. Rare numbness/tingling down the leg. Patient Stated Goals: return to full activity including walking, exercise    SUBJECTIVE     Pt reports Pt reports an overall feeling of stiffness due to lack of movement but no significant changes in pain. Pt reports being compliant with HEP. Scared to mobilize. Pain: 5/10 Pre treatment  Pain : 2/10 Post treatment    OBJECTIVE     Findings:    Swelling/Edema: moderate R knee  mid-patella girth: 38.1 cm L,  34.6 cm R    ROM Measures:    L PROM R AROM Comment   Knee Extension +7 +10     Knee Flexion 43* 146 Following AA heel slides   [facility]    Ankle [place]              Treatment provided today:    Therapeutic exercise (81216) x 27 min to develop ROM, strength, endurance and flexibility of surgical knee. Quad Set with NMES  10/10- Intensity: 42 mA- pt gradually increased intensity  20 minutes  AA Heel slides with strap: 4x5 reps  Calf Stretch with strap : 2x20s  Ankle Pumps x 40 reps  Glute Sets 2x10      Manual therapy (43837) x 5 min utilizing techniques to improve joint and/or soft tissue mobility, ROM, and function as well as helping to decrease pain/spasms and swelling. Palpation and assessment of soft tissue, muscles, and landmarks   Patellar Mobilization sup/inf Grade I    Therapeutic activities (71276) x 15 min using dynamic activities to improve function related to left knee.   Gait Training  Standing weight shifts  TDWB gait in locked Brace with crutches    Vasopneumatic Compression (79340) with cold x 15 minutes: to surgical knee in order to reduce

## 2023-04-07 ENCOUNTER — OFFICE VISIT (OUTPATIENT)
Age: 33
End: 2023-04-07

## 2023-04-07 DIAGNOSIS — M25.362 PATELLAR INSTABILITY OF LEFT KNEE: ICD-10-CM

## 2023-04-07 DIAGNOSIS — M25.462 EFFUSION OF LEFT KNEE: ICD-10-CM

## 2023-04-07 DIAGNOSIS — Z74.09 IMPAIRED FUNCTIONAL MOBILITY, BALANCE, GAIT, AND ENDURANCE: ICD-10-CM

## 2023-04-07 DIAGNOSIS — M25.662 KNEE STIFFNESS, LEFT: ICD-10-CM

## 2023-04-07 DIAGNOSIS — M25.562 ACUTE PAIN OF LEFT KNEE: Primary | ICD-10-CM

## 2023-04-07 DIAGNOSIS — M62.81 MUSCLE WEAKNESS: ICD-10-CM

## 2023-04-17 ENCOUNTER — OFFICE VISIT (OUTPATIENT)
Age: 33
End: 2023-04-17

## 2023-04-17 DIAGNOSIS — M25.662 KNEE STIFFNESS, LEFT: ICD-10-CM

## 2023-04-17 DIAGNOSIS — M62.81 MUSCLE WEAKNESS: ICD-10-CM

## 2023-04-17 DIAGNOSIS — Z74.09 IMPAIRED FUNCTIONAL MOBILITY, BALANCE, GAIT, AND ENDURANCE: ICD-10-CM

## 2023-04-17 DIAGNOSIS — M25.562 ACUTE PAIN OF LEFT KNEE: Primary | ICD-10-CM

## 2023-04-17 DIAGNOSIS — M25.462 EFFUSION OF LEFT KNEE: ICD-10-CM

## 2023-04-17 PROCEDURE — 97110 THERAPEUTIC EXERCISES: CPT | Performed by: PHYSICAL THERAPIST

## 2023-04-17 PROCEDURE — 97016 VASOPNEUMATIC DEVICE THERAPY: CPT | Performed by: PHYSICAL THERAPIST

## 2023-04-17 PROCEDURE — 97116 GAIT TRAINING THERAPY: CPT | Performed by: PHYSICAL THERAPIST

## 2023-04-17 NOTE — PROGRESS NOTES
[facility] Cyndy Palencia  [address] [place] [id number]  Dept: [phone]      Physical Therapy Daily Note     Referring MD: Akin Solomon MD  Diagnosis:     ICD-10-CM    1. Acute pain of left knee  M25.562       2. Knee stiffness, left  M25.662       3. Effusion of left knee  M25.462       4. Impaired functional mobility, balance, gait, and endurance  Z74.09       5. Muscle weakness  M62.81          Surgery: Left Knee Medial Patellofemoral Ligament Reconstruction Hamstring Autograft And Partial Patellectomy/ Lateral Retinacular Lengthening - Left  Date: 3/30/23  Therapy precautions: Rehab protocol: Routine MPFL protocol, motion as tolerated, gradual progression of weightbearing as tolerated in extension  Co-morbidities affecting plan of care: none  Patient Stated Goals: return to full activity including walking, exercise    Total Timed Procedure Codes: 60 min, Total Time: 75 min Modifier needed: No    SUBJECTIVE     Pt reports that she received her NMES unit but forgot to bring it today. She would like to try it here before trying it at home. OBJECTIVE     ROM Measures:   L PROM 4/3/23 4/7/23 4/11   Knee Extension +5 +7 NT   Knee Flexion 30 43* 45     Treatment provided today:    Therapeutic exercise (24734) x 47 min to develop ROM, strength, endurance and flexibility of surgical knee.   Standing L hip abduction 2x15 in brace  Standing L hip extension 2x15 in brace  Standing L hip flexion 2x15 in brace  Standing heel raises 2x10 in brace  Mosque pews x 20 in brace  NMES L quadriceps  10sec on/20sec off  Quad set x 10 min  Gluteal set w/ quad set x 5 min  Isometric hip adduction w/ quad set x 5 min  Isometric hip abduction w/ quad set x 5 min  AA Heel slides with strap: 4x5 reps  Calf Stretch with strap : 3x20s  PROM L knee flexion w/ light manual medial patella glide    Manual therapy (88489) x 5 min utilizing techniques to improve joint and/or soft tissue mobility, ROM, and

## 2023-04-20 ENCOUNTER — TREATMENT (OUTPATIENT)
Age: 33
End: 2023-04-20

## 2023-04-20 DIAGNOSIS — M25.562 ACUTE PAIN OF LEFT KNEE: Primary | ICD-10-CM

## 2023-04-20 DIAGNOSIS — M25.462 EFFUSION OF LEFT KNEE: ICD-10-CM

## 2023-04-20 DIAGNOSIS — M62.81 MUSCLE WEAKNESS: ICD-10-CM

## 2023-04-20 DIAGNOSIS — M25.662 KNEE STIFFNESS, LEFT: ICD-10-CM

## 2023-04-20 DIAGNOSIS — Z74.09 IMPAIRED FUNCTIONAL MOBILITY, BALANCE, GAIT, AND ENDURANCE: ICD-10-CM

## 2023-04-24 ENCOUNTER — TREATMENT (OUTPATIENT)
Age: 33
End: 2023-04-24

## 2023-04-24 DIAGNOSIS — Z74.09 IMPAIRED FUNCTIONAL MOBILITY, BALANCE, GAIT, AND ENDURANCE: ICD-10-CM

## 2023-04-24 DIAGNOSIS — M25.662 KNEE STIFFNESS, LEFT: ICD-10-CM

## 2023-04-24 DIAGNOSIS — M25.562 ACUTE PAIN OF LEFT KNEE: Primary | ICD-10-CM

## 2023-04-24 DIAGNOSIS — M25.462 EFFUSION OF LEFT KNEE: ICD-10-CM

## 2023-04-24 DIAGNOSIS — M62.81 MUSCLE WEAKNESS: ICD-10-CM

## 2023-04-24 PROCEDURE — 97110 THERAPEUTIC EXERCISES: CPT | Performed by: PHYSICAL THERAPIST

## 2023-04-24 PROCEDURE — 97140 MANUAL THERAPY 1/> REGIONS: CPT | Performed by: PHYSICAL THERAPIST

## 2023-04-24 PROCEDURE — 97016 VASOPNEUMATIC DEVICE THERAPY: CPT | Performed by: PHYSICAL THERAPIST

## 2023-04-24 NOTE — PROGRESS NOTES
[facility] Ever Thibodeaux  [address] [place] [phone]  Dept: [phone]      Physical Therapy Daily Note     Referring MD: Callie Cook MD  Diagnosis:     ICD-10-CM    1. Acute pain of left knee  M25.562       2. Knee stiffness, left  M25.662       3. Effusion of left knee  M25.462       4. Impaired functional mobility, balance, gait, and endurance  Z74.09       5. Muscle weakness  M62.81          Surgery: Left Knee Medial Patellofemoral Ligament Reconstruction Hamstring Autograft And Partial Patellectomy/ Lateral Retinacular Lengthening - Left  Date: 3/30/23  Therapy precautions: Rehab protocol: Routine MPFL protocol, motion as tolerated, gradual progression of weightbearing as tolerated in extension  Co-morbidities affecting plan of care: none  Patient Stated Goals: return to full activity including walking, exercise    Total Timed Procedure Codes: 45 min, Total Time: 60 min Modifier needed: No  Episode visit count:  7     SUBJECTIVE     Pt has been using her NMES unit at home. She is unlocking her brace to sit. OBJECTIVE     ROM Measures:   L PROM 4/3/23 4/7/23 4/11 4/20 4/24   Knee Extension +5 +7 NT     Knee Flexion 30 43* 45 50 56       Therapeutic exercise (17099) x 37 min to develop ROM, strength, endurance and flexibility of surgical knee. Standing L hip abduction 2x15 in brace  Standing L hip extension 2x15 in brace  Standing L hip flexion 2x15 in brace  Standing heel raises 3x10 in brace  Spiritism pews x 20 in brace  NMES L quadriceps- pt instructed in use of home [employer] pain relief and recovery system.   Set at EMS, Program 6  5 sec on/10sec off w/ 2 sec ramp up and down  Quad set x 10 min  Isometric hip adduction w/ quad set x 5 min  AA Heel slides with strap: 3x5 reps  Calf Stretch with strap : 3x20s  Supine plantarflexion against red band x 30  PROM L knee flexion w/ light manual medial patella glide in supine and sitting    Manual therapy (27312) x 8 min

## 2023-04-26 NOTE — PROGRESS NOTES
[facility] Bernhard Dandy  [address] [place] [phone]  Dept: [phone]      Physical Therapy Daily Note     Referring MD: Candee Closs, MD  Diagnosis:     ICD-10-CM    1. Acute pain of left knee  M25.562       2. Knee stiffness, left  M25.662       3. Effusion of left knee  M25.462       4. Impaired functional mobility, balance, gait, and endurance  Z74.09       5. Muscle weakness  M62.81          Surgery: Left Knee Medial Patellofemoral Ligament Reconstruction Hamstring Autograft And Partial Patellectomy/ Lateral Retinacular Lengthening - Left  Date: 3/30/23  Therapy precautions: Rehab protocol: Routine MPFL protocol, motion as tolerated, gradual progression of weightbearing as tolerated in extension  Co-morbidities affecting plan of care: none  Patient Stated Goals: return to full activity including walking, exercise    Total Timed Procedure Codes: 40 min, Total Time: 55 min Modifier needed: No  Episode visit count:  8     SUBJECTIVE     Pt reports that her L knee feels tight when she performs her heel raises. She is wearing the brace while completing them. Pt has been working more on putting weight through the L leg and bending the knee. Pt is using her e-stim unit at home. OBJECTIVE   4 weeks post-op    ROM Measures:   L PROM 4/3/23 4/7/23 4/11 4/20 4/24 4/27   Knee Extension +5 +7 NT      Knee Flexion 30 43* 45 50 56 60       Therapeutic exercise (30958) x 32 min to develop ROM, strength, endurance and flexibility of surgical knee.   Standing heel raises 3x10 in brace  Bahai pews 2x 1 min in brace  Supine L SLR in brace AA up w/ eccentric lowering 2x5  Sidelying L hip abduction in brace x 10  Supine wall slides to increase L knee flexion H10sec x 10 w/ RLE assist, completed x 1 in brace to increase pt comfort for home performance  Isometric hip adduction w/ quad set x 5 min  AA Heel slides with strap: H10sec x 10  Calf Stretch with strap : 3x20s  PROM L knee flexion w/

## 2023-04-27 ENCOUNTER — TREATMENT (OUTPATIENT)
Age: 33
End: 2023-04-27

## 2023-04-27 DIAGNOSIS — M25.662 KNEE STIFFNESS, LEFT: ICD-10-CM

## 2023-04-27 DIAGNOSIS — M25.562 ACUTE PAIN OF LEFT KNEE: Primary | ICD-10-CM

## 2023-04-27 DIAGNOSIS — Z74.09 IMPAIRED FUNCTIONAL MOBILITY, BALANCE, GAIT, AND ENDURANCE: ICD-10-CM

## 2023-04-27 DIAGNOSIS — M25.462 EFFUSION OF LEFT KNEE: ICD-10-CM

## 2023-04-27 DIAGNOSIS — M62.81 MUSCLE WEAKNESS: ICD-10-CM

## 2023-04-27 PROCEDURE — 97016 VASOPNEUMATIC DEVICE THERAPY: CPT | Performed by: PHYSICAL THERAPIST

## 2023-04-27 PROCEDURE — 97110 THERAPEUTIC EXERCISES: CPT | Performed by: PHYSICAL THERAPIST

## 2023-04-27 PROCEDURE — 97140 MANUAL THERAPY 1/> REGIONS: CPT | Performed by: PHYSICAL THERAPIST

## 2023-05-01 ENCOUNTER — TREATMENT (OUTPATIENT)
Age: 33
End: 2023-05-01

## 2023-05-01 DIAGNOSIS — M25.662 KNEE STIFFNESS, LEFT: ICD-10-CM

## 2023-05-01 DIAGNOSIS — M25.462 EFFUSION OF LEFT KNEE: ICD-10-CM

## 2023-05-01 DIAGNOSIS — M62.81 MUSCLE WEAKNESS: ICD-10-CM

## 2023-05-01 DIAGNOSIS — Z74.09 IMPAIRED FUNCTIONAL MOBILITY, BALANCE, GAIT, AND ENDURANCE: ICD-10-CM

## 2023-05-01 DIAGNOSIS — M25.562 ACUTE PAIN OF LEFT KNEE: Primary | ICD-10-CM

## 2023-05-01 PROCEDURE — 97110 THERAPEUTIC EXERCISES: CPT | Performed by: PHYSICAL THERAPIST

## 2023-05-01 PROCEDURE — 97140 MANUAL THERAPY 1/> REGIONS: CPT | Performed by: PHYSICAL THERAPIST

## 2023-05-01 PROCEDURE — 97016 VASOPNEUMATIC DEVICE THERAPY: CPT | Performed by: PHYSICAL THERAPIST

## 2023-05-01 NOTE — PROGRESS NOTES
[facility] Madeline Siddiqi  [address] [place] [phone]  Dept: [phone]      Physical Therapy Progress Report     Referring MD: Jose Pantoja MD  Diagnosis:     ICD-10-CM    1. Acute pain of left knee  M25.562       2. Knee stiffness, left  M25.662       3. Effusion of left knee  M25.462       4. Impaired functional mobility, balance, gait, and endurance  Z74.09       5. Muscle weakness  M62.81          Surgery: Left Knee Medial Patellofemoral Ligament Reconstruction Hamstring Autograft And Partial Patellectomy/ Lateral Retinacular Lengthening - Left  Date: 3/30/23  Therapy precautions: Rehab protocol: Routine MPFL protocol, motion as tolerated, gradual progression of weightbearing as tolerated in extension  Co-morbidities affecting plan of care: none  Patient Stated Goals: return to full activity including walking, exercise    Total Timed Procedure Codes: 40 min, Total Time: 55 min Modifier needed: No  Episode visit count:  9     SUBJECTIVE     Pt reports that she has been working on bend ing the knee/stretching as well as putting more weight through the LLE. Pt did ask if she could place foot on floor when standing. OBJECTIVE   4 weeks post-op    ROM Measures:   L PROM 4/3/23 4/11 4/20 4/27 5/1   Knee Extension +5 NT   +8   Knee Flexion 30 45 50 60 70     Quad set: fair (was trace), able to perform SLR in brace x 5 reps  Girth mid-patella: 37.6 cm (was 38.7 cm)    Therapeutic exercise (69250) x 32 min to develop ROM, strength, endurance and flexibility of surgical knee.   Standing L hip abduction 2x15 in brace  Standing heel raises 3x10 in brace  Latter day pews 2x 1 min in brace  Supine L SLR in brace 2x5  Sidelying L hip abduction in brace 2x10  Supine wall slides to increase L knee flexion H30sec x 3 w/ RLE assist  AA Heel slides with strap: H10sec x 10  PROM L knee flexion w/ light manual medial patella glide in supine and sitting    Manual therapy (69361) x 8 min utilizing

## 2023-05-04 ENCOUNTER — TREATMENT (OUTPATIENT)
Age: 33
End: 2023-05-04

## 2023-05-04 DIAGNOSIS — Z74.09 IMPAIRED FUNCTIONAL MOBILITY, BALANCE, GAIT, AND ENDURANCE: ICD-10-CM

## 2023-05-04 DIAGNOSIS — M62.81 MUSCLE WEAKNESS: ICD-10-CM

## 2023-05-04 DIAGNOSIS — M25.462 EFFUSION OF LEFT KNEE: ICD-10-CM

## 2023-05-04 DIAGNOSIS — M25.562 ACUTE PAIN OF LEFT KNEE: Primary | ICD-10-CM

## 2023-05-04 DIAGNOSIS — M25.662 KNEE STIFFNESS, LEFT: ICD-10-CM

## 2023-05-04 NOTE — PROGRESS NOTES
[facility] Radha [address] [address]  Dept: [phone]      Physical Therapy Daily Note     Referring MD: Daniela Carpenter MD  Diagnosis:     ICD-10-CM    1. Acute pain of left knee  M25.562       2. Knee stiffness, left  M25.662       3. Effusion of left knee  M25.462       4. Impaired functional mobility, balance, gait, and endurance  Z74.09       5. Muscle weakness  M62.81          Surgery: Left Knee Medial Patellofemoral Ligament Reconstruction Hamstring Autograft And Partial Patellectomy/ Lateral Retinacular Lengthening - Left  Date: 3/30/23  Therapy precautions: Rehab protocol: Routine MPFL protocol, motion as tolerated, gradual progression of weightbearing as tolerated in extension  Co-morbidities affecting plan of care: none  Patient Stated Goals: return to full activity including walking, exercise    Total Timed Procedure Codes: 53 min, Total Time: 68 min Modifier needed: No  Episode visit count:  10     SUBJECTIVE     Pt reports compliance with HEP. OBJECTIVE   5 weeks post-op  L knee flexion PROM 70 degrees    Therapeutic exercise (10311) x 30 min to develop ROM, strength, endurance and flexibility of surgical knee. Bilateral leg press 30# in unlocked brace 3x10  Standing heel raises 3x10 in brace  L knee passive flexion using flexionator H30sec x 5  Supine L SLR in brace 2x8  Sidelying L hip abduction in brace 3x10  Prone L quad set H8sec x 20  AA Heel slides with strap: H10sec x 10  PROM L knee flexion w/ light manual medial patella glide in supine and sitting  Updated HEP, will use flexionator 5x1 min 4-5x/day    Manual therapy (45552) x 8 min utilizing techniques to improve joint and/or soft tissue mobility, ROM, and function as well as helping to decrease pain/spasms and swelling.   Palpation and assessment of soft tissue, muscles, and landmarks   Patellar Mobilization sup/inf/medial Grade III-IV  STM patellar facets and distal qudricpes  Gait

## 2023-05-08 ENCOUNTER — TREATMENT (OUTPATIENT)
Age: 33
End: 2023-05-08

## 2023-05-08 DIAGNOSIS — M62.81 MUSCLE WEAKNESS: ICD-10-CM

## 2023-05-08 DIAGNOSIS — M25.462 EFFUSION OF LEFT KNEE: ICD-10-CM

## 2023-05-08 DIAGNOSIS — Z74.09 IMPAIRED FUNCTIONAL MOBILITY, BALANCE, GAIT, AND ENDURANCE: ICD-10-CM

## 2023-05-08 DIAGNOSIS — M25.562 ACUTE PAIN OF LEFT KNEE: Primary | ICD-10-CM

## 2023-05-08 DIAGNOSIS — M25.662 KNEE STIFFNESS, LEFT: ICD-10-CM

## 2023-05-08 PROCEDURE — 97016 VASOPNEUMATIC DEVICE THERAPY: CPT | Performed by: PHYSICAL THERAPIST

## 2023-05-08 PROCEDURE — 97116 GAIT TRAINING THERAPY: CPT | Performed by: PHYSICAL THERAPIST

## 2023-05-08 PROCEDURE — 97110 THERAPEUTIC EXERCISES: CPT | Performed by: PHYSICAL THERAPIST

## 2023-05-08 NOTE — PROGRESS NOTES
to fair for improved knee stability with gait. Goal Met 5/1/2023   Pt will ambulate modified independent in locked brace without device for household/community ambulation. Goal NOT Met 5/1/2023  Improve LEFS to = 20/80, showing gains in ADLs and daily tasks. Will test next session    Short term goals to be met by 5/15/2023  (6 weeks): Increase knee AROM of involved LE to 100 degrees, in order to sit more comfortably in a low chair. Pt will demonstrate ability to perform 3x10 supine L SLR, indicating improved quad control for progression to gait without device. Pt will ambulate modified independent in locked brace without device for household/community ambulation. Long term goals to be met by 6/26/2023  (12 weeks):  Pt will be compliant and independent with a comprehensive HEP and activity progression. Demonstrate AROM of involved knee to be 135 degrees, allowing for ADLs with min restrictions related to knee stiffness. Pt will increase LE strength to at least 5/5 with MMT for improved stability during gait/transfers. Pt will ambulate without gait deviations using proper heel>toe pattern. Pt will independently ascend and descend steps with reciprocal pattern demonstrating good control and balance using rails only for balance. Improve LEFS to 45/80 demonstrating min functional restrictions with ADLs, work and athletic activities. [employer]  Access Code: [email or web address]  URL: [email or web address]  Date: 05/04/2023  Prepared by: Marie Reyna    Exercises  - Supine Heel Slide with Strap  - 3 x daily - 1 sets - 10 reps - 10 hold  - Seated Active Assistive Knee Flexion and Extension  - 2 x daily - 1 sets - 10 reps - 10-20 sec hold  - Supine Knee Flexion Wall Slide  - 2 x daily - 1 sets - 10 reps - 10 hold  - Supine Quadricep Sets  - 4 x daily - 2 sets - 10 reps - 5 hold  - Supine Straight Leg Hip Adduction and Quad Set with Ball  - 2 x daily - 2 sets - 10 reps - 5 hold  - Supine Active Straight

## 2023-05-11 ENCOUNTER — OFFICE VISIT (OUTPATIENT)
Dept: ORTHOPEDIC SURGERY | Age: 33
End: 2023-05-11

## 2023-05-11 ENCOUNTER — TREATMENT (OUTPATIENT)
Age: 33
End: 2023-05-11

## 2023-05-11 DIAGNOSIS — Z74.09 IMPAIRED FUNCTIONAL MOBILITY, BALANCE, GAIT, AND ENDURANCE: ICD-10-CM

## 2023-05-11 DIAGNOSIS — M25.662 KNEE STIFFNESS, LEFT: ICD-10-CM

## 2023-05-11 DIAGNOSIS — M25.562 ACUTE PAIN OF LEFT KNEE: Primary | ICD-10-CM

## 2023-05-11 DIAGNOSIS — M25.362 PATELLAR INSTABILITY OF LEFT KNEE: ICD-10-CM

## 2023-05-11 DIAGNOSIS — M25.462 EFFUSION OF LEFT KNEE: ICD-10-CM

## 2023-05-11 DIAGNOSIS — M62.81 MUSCLE WEAKNESS: ICD-10-CM

## 2023-05-11 DIAGNOSIS — Z09 S/P ORTHOPEDIC SURGERY, FOLLOW-UP EXAM: Primary | ICD-10-CM

## 2023-05-11 NOTE — PROGRESS NOTES
Name: Haroon Márquez  YOB: 1990  Gender: female  MRN: [id number]    Procedure Performed:   1) Left Knee arthroscopy assisted medial patellofemoral ligament reconstruction with allograft  2)Left knee partial patellectomy  3) left knee lateral retinacular lengthening      Date of Procedure: 03/30/2023      Subjective: She is 6 weeks s/p of the above procedure and is progressing well. She has no concerns or complaints at this time other than struggling with stiffness. Physical Examination:Incisions clean dry and intact, no sign of infection. Motor and sensory intact. Passive extension about 0 flexion to about 80 to 90 degrees. Pain at the extremes. Patella is mobile and stable. Assessment:   1. S/P orthopedic surgery, follow-up exam    2. Patellar instability of left knee         Plan:   Doing well overall. We discussed how important it is to regain her flexion range of motion. I taught her some bedside techniques today. We can consider a home splint if necessary. I would recommend continuing physical therapy. We also talked about the possibility of arthroscopic lysis of adhesions and manipulation under anesthesia if necessary. Hopefully she can make some good progress with physical therapy over the next month. .     Follow up in 4 weeks

## 2023-05-11 NOTE — PROGRESS NOTES
knee stability with gait. Goal Met 5/1/2023   Pt will ambulate modified independent in locked brace without device for household/community ambulation. Goal NOT Met 5/1/2023  Improve LEFS to = 20/80, showing gains in ADLs and daily tasks. Will test next session    Short term goals to be met by 5/15/2023  (6 weeks): Increase knee AROM of involved LE to 100 degrees, in order to sit more comfortably in a low chair. Pt will demonstrate ability to perform 3x10 supine L SLR, indicating improved quad control for progression to gait without device. Pt will ambulate modified independent in locked brace without device for household/community ambulation. Long term goals to be met by 6/26/2023  (12 weeks):  Pt will be compliant and independent with a comprehensive HEP and activity progression. Demonstrate AROM of involved knee to be 135 degrees, allowing for ADLs with min restrictions related to knee stiffness. Pt will increase LE strength to at least 5/5 with MMT for improved stability during gait/transfers. Pt will ambulate without gait deviations using proper heel>toe pattern. Pt will independently ascend and descend steps with reciprocal pattern demonstrating good control and balance using rails only for balance. Improve LEFS to 45/80 demonstrating min functional restrictions with ADLs, work and athletic activities. [employer]  Access Code: [email or web address]  URL: [email or web address]  Date: 05/04/2023  Prepared by: Brady Dixon    Exercises  - Supine Heel Slide with Strap  - 3 x daily - 1 sets - 10 reps - 10 hold  - Seated Active Assistive Knee Flexion and Extension  - 2 x daily - 1 sets - 10 reps - 10-20 sec hold  - Supine Knee Flexion Wall Slide  - 2 x daily - 1 sets - 10 reps - 10 hold  - Supine Quadricep Sets  - 4 x daily - 2 sets - 10 reps - 5 hold  - Supine Straight Leg Hip Adduction and Quad Set with Ball  - 2 x daily - 2 sets - 10 reps - 5 hold  - Supine Active Straight Leg Raise  - 4 x

## 2023-05-15 ENCOUNTER — TREATMENT (OUTPATIENT)
Age: 33
End: 2023-05-15

## 2023-05-15 DIAGNOSIS — M25.462 EFFUSION OF LEFT KNEE: ICD-10-CM

## 2023-05-15 DIAGNOSIS — Z74.09 IMPAIRED FUNCTIONAL MOBILITY, BALANCE, GAIT, AND ENDURANCE: ICD-10-CM

## 2023-05-15 DIAGNOSIS — M62.81 MUSCLE WEAKNESS: ICD-10-CM

## 2023-05-15 DIAGNOSIS — M25.562 ACUTE PAIN OF LEFT KNEE: Primary | ICD-10-CM

## 2023-05-15 DIAGNOSIS — M25.662 KNEE STIFFNESS, LEFT: ICD-10-CM

## 2023-05-15 PROCEDURE — 97110 THERAPEUTIC EXERCISES: CPT | Performed by: PHYSICAL THERAPIST

## 2023-05-15 PROCEDURE — 97140 MANUAL THERAPY 1/> REGIONS: CPT | Performed by: PHYSICAL THERAPIST

## 2023-05-15 PROCEDURE — 97016 VASOPNEUMATIC DEVICE THERAPY: CPT | Performed by: PHYSICAL THERAPIST

## 2023-05-15 PROCEDURE — 97116 GAIT TRAINING THERAPY: CPT | Performed by: PHYSICAL THERAPIST

## 2023-05-15 NOTE — PROGRESS NOTES
[facility] - [facility]  [address] [place] [phone]  Dept: [phone]      Physical Therapy Daily Note     Referring MD: Levon Mohr MD  Diagnosis:     ICD-10-CM    1. Acute pain of left knee  M25.562       2. Knee stiffness, left  M25.662       3. Effusion of left knee  M25.462       4. Muscle weakness  M62.81       5. Impaired functional mobility, balance, gait, and endurance  Z74.09            Surgery: Left Knee Medial Patellofemoral Ligament Reconstruction Hamstring Autograft And Partial Patellectomy/ Lateral Retinacular Lengthening - Left  Date: 3/30/23  Therapy precautions: Rehab protocol: Routine MPFL protocol, motion as tolerated, gradual progression of weightbearing as tolerated in extension  Co-morbidities affecting plan of care: none  Patient Stated Goals: return to full activity including walking, exercise    Total Timed Procedure Codes: 53 min, Total Time: 68 min Modifier needed: No  Episode visit count:  13     SUBJECTIVE     Pt reports using flexionator 1.5 min for 5 reps, 3x/day. She continues to use the NMES unit.     OBJECTIVE   6 weeks post-op  Supine SLR- completed independently     Therapeutic exercise (62248) x 37 min to develop ROM, strength, endurance and flexibility of surgical knee.  Nustep in unlocked brace level 1 x 5 min  Bilateral leg press 40# in unlocked brace 3x10  Partial squats in unlocked brace 2x10  Supine SLR without brace 3x10- CGA to initiate 1st rep  Prone L quad set H5sec x 20  Bridges over wedge H5sec 2x10  Short arc quad 2x10   PROM L knee flexion w/ light manual medial patella glide in prone and sitting  Updated HEP, increase flexionator use to 5x/day    Manual therapy (43844) x 8 min utilizing techniques to improve joint and/or soft tissue mobility, ROM, and function as well as helping to decrease pain/spasms and swelling.  Palpation and assessment of soft tissue, muscles, and landmarks   Patellar Mobilization sup/inf/medial Grade

## 2023-05-18 ENCOUNTER — TREATMENT (OUTPATIENT)
Age: 33
End: 2023-05-18

## 2023-05-18 DIAGNOSIS — M25.362 PATELLAR INSTABILITY OF LEFT KNEE: ICD-10-CM

## 2023-05-18 DIAGNOSIS — M25.662 KNEE STIFFNESS, LEFT: ICD-10-CM

## 2023-05-18 DIAGNOSIS — M62.81 MUSCLE WEAKNESS: ICD-10-CM

## 2023-05-18 DIAGNOSIS — M25.462 EFFUSION OF LEFT KNEE: ICD-10-CM

## 2023-05-18 DIAGNOSIS — M25.562 ACUTE PAIN OF LEFT KNEE: Primary | ICD-10-CM

## 2023-05-18 DIAGNOSIS — Z74.09 IMPAIRED FUNCTIONAL MOBILITY, BALANCE, GAIT, AND ENDURANCE: ICD-10-CM

## 2023-05-18 NOTE — PROGRESS NOTES
[facility] Paco Schwartz  [address] [place] [id number]  Dept: [phone]      Physical Therapy Daily Note     Referring MD: Ena Peacock MD  Diagnosis:     ICD-10-CM    1. Acute pain of left knee  M25.562       2. Knee stiffness, left  M25.662       3. Effusion of left knee  M25.462       4. Muscle weakness  M62.81       5. Impaired functional mobility, balance, gait, and endurance  Z74.09       6. Patellar instability of left knee [M25.362 (ICD-10-CM)]  M25.362            Surgery: Left Knee Medial Patellofemoral Ligament Reconstruction Hamstring Autograft And Partial Patellectomy/ Lateral Retinacular Lengthening - Left  Date: 3/30/23  Therapy precautions: Rehab protocol: Routine MPFL protocol, motion as tolerated, gradual progression of weightbearing as tolerated in extension  Co-morbidities affecting plan of care: none  Patient Stated Goals: return to full activity including walking, exercise    Total Timed Procedure Codes: 53 min, Total Time: 53 min Modifier needed: No  Episode visit count:  14     SUBJECTIVE     Pt continues to use the flexionator 3x/day but has increased the hold time to 5 min at last rep each session. She is waling with locked brace and 1 crutch in the home. OBJECTIVE   6 weeks post-op    Therapeutic exercise (93446) x 40 min to develop ROM, strength, endurance and flexibility of surgical knee. Nustep in unlocked brace level 1 x 5 min  Bilateral leg press 50# in unlocked brace 3x10  Blood Flow Restriction Training:  Patient denied hx of clotting or lymphatic disorders, and stated her HTN was controlled with medication. Provided verbal consent for use of BFR after being educated on treatment, expectations, and post-treatment instructions.    BFR contraindications: Reviewed- none noted  Cuff size and Location medium  L thigh   Arterial Occlusion Pressure 70% of 210 mmHG (147 mmHg)   Therapeutic Exercises: [id number]  30\" rest between sets Supine L SLR  L

## 2023-05-22 ENCOUNTER — TREATMENT (OUTPATIENT)
Age: 33
End: 2023-05-22

## 2023-05-22 DIAGNOSIS — M62.81 MUSCLE WEAKNESS: ICD-10-CM

## 2023-05-22 DIAGNOSIS — M25.562 ACUTE PAIN OF LEFT KNEE: Primary | ICD-10-CM

## 2023-05-22 DIAGNOSIS — Z74.09 IMPAIRED FUNCTIONAL MOBILITY, BALANCE, GAIT, AND ENDURANCE: ICD-10-CM

## 2023-05-22 DIAGNOSIS — M25.462 EFFUSION OF LEFT KNEE: ICD-10-CM

## 2023-05-22 DIAGNOSIS — M25.662 KNEE STIFFNESS, LEFT: ICD-10-CM

## 2023-05-22 PROCEDURE — 97140 MANUAL THERAPY 1/> REGIONS: CPT | Performed by: PHYSICAL THERAPIST

## 2023-05-22 PROCEDURE — 97110 THERAPEUTIC EXERCISES: CPT | Performed by: PHYSICAL THERAPIST

## 2023-05-25 ENCOUNTER — TREATMENT (OUTPATIENT)
Age: 33
End: 2023-05-25

## 2023-05-25 DIAGNOSIS — M25.562 ACUTE PAIN OF LEFT KNEE: Primary | ICD-10-CM

## 2023-05-25 DIAGNOSIS — M25.462 EFFUSION OF LEFT KNEE: ICD-10-CM

## 2023-05-25 DIAGNOSIS — Z74.09 IMPAIRED FUNCTIONAL MOBILITY, BALANCE, GAIT, AND ENDURANCE: ICD-10-CM

## 2023-05-25 DIAGNOSIS — M25.662 KNEE STIFFNESS, LEFT: ICD-10-CM

## 2023-05-25 DIAGNOSIS — M62.81 MUSCLE WEAKNESS: ICD-10-CM

## 2023-05-25 NOTE — PROGRESS NOTES
mobilizations  STM patellar facets and distal qudricpes  Gait training ([id number]) x 10 min to address mechanics, proper step length and weight shifting to improve household and community mobility as well as overall safety with ADLs. Stride stance weight shifts with LLE posterior working on relaxing into knee flexion  Side step along counter with unlocked brace and sliding hands 10 feet x 2 B  Gait w/ bilateral axillary crutches and unlocked brace- focus on knee flexion through swing phase and L heel strike       ASSESSMENT     Pt able to complete partial revolutions on bike. Unlocked brace as pt able to complete 30 supine SLR without lag and to promote knee flexion during the day. Pt with continued difficulty trusting the LLE with full weight bearing but was afe with use of unlocked brace and B axillary crutches. PLAN     Continue focus on ROM, add L single leg stance, LEFS    Goals:  Short term goals to be met by 5/1/2023  (4 weeks):  Pt will demonstrate good recall of HEP requiring minimal verbal cuing for proper form and technique. Goal Met 5/1/2023   Pt will decrease swelling at involved LE/knee by 1 cm at mid-patella, which contributes to greater ROM and less pain with ADLs. Goal Met 5/1/2023   Increase knee AROM of involved LE to 8-0-120 degrees, in order to sit more comfortably in a low chair. Goal Met 5/1/2023 for extension only  Pt will improve quad set to fair for improved knee stability with gait. Goal Met 5/1/2023   Pt will ambulate modified independent in locked brace without device for household/community ambulation. Goal NOT Met 5/1/2023  Improve LEFS to = 20/80, showing gains in ADLs and daily tasks. Will test next session    Short term goals to be met by 5/15/2023  (6 weeks): Increase knee AROM of involved LE to 100 degrees, in order to sit more comfortably in a low chair.  Goal Not Met 5/25/2023 - Continue   Pt will demonstrate ability to perform 3x10 supine L SLR, indicating improved quad

## 2023-05-30 ENCOUNTER — TREATMENT (OUTPATIENT)
Age: 33
End: 2023-05-30

## 2023-05-30 DIAGNOSIS — M62.81 MUSCLE WEAKNESS: ICD-10-CM

## 2023-05-30 DIAGNOSIS — M25.462 EFFUSION OF LEFT KNEE: ICD-10-CM

## 2023-05-30 DIAGNOSIS — M25.562 ACUTE PAIN OF LEFT KNEE: Primary | ICD-10-CM

## 2023-05-30 DIAGNOSIS — Z74.09 IMPAIRED FUNCTIONAL MOBILITY, BALANCE, GAIT, AND ENDURANCE: ICD-10-CM

## 2023-05-30 DIAGNOSIS — M25.662 KNEE STIFFNESS, LEFT: ICD-10-CM

## 2023-05-30 PROCEDURE — 97016 VASOPNEUMATIC DEVICE THERAPY: CPT | Performed by: PHYSICAL THERAPIST

## 2023-05-30 PROCEDURE — 97116 GAIT TRAINING THERAPY: CPT | Performed by: PHYSICAL THERAPIST

## 2023-05-30 PROCEDURE — 97110 THERAPEUTIC EXERCISES: CPT | Performed by: PHYSICAL THERAPIST

## 2023-06-01 ENCOUNTER — TREATMENT (OUTPATIENT)
Age: 33
End: 2023-06-01

## 2023-06-01 DIAGNOSIS — M25.562 ACUTE PAIN OF LEFT KNEE: Primary | ICD-10-CM

## 2023-06-01 DIAGNOSIS — M25.462 EFFUSION OF LEFT KNEE: ICD-10-CM

## 2023-06-01 DIAGNOSIS — M62.81 MUSCLE WEAKNESS: ICD-10-CM

## 2023-06-01 DIAGNOSIS — Z74.09 IMPAIRED FUNCTIONAL MOBILITY, BALANCE, GAIT, AND ENDURANCE: ICD-10-CM

## 2023-06-01 DIAGNOSIS — M25.662 KNEE STIFFNESS, LEFT: ICD-10-CM

## 2023-06-01 NOTE — PROGRESS NOTES
[facility]  [address] [place] [phone]  Dept: [phone]      Physical Therapy Daily Note     Referring MD: Tura Leventhal, MD  Diagnosis:     ICD-10-CM    1. Acute pain of left knee  M25.562       2. Knee stiffness, left  M25.662       3. Effusion of left knee  M25.462       4. Muscle weakness  M62.81       5. Impaired functional mobility, balance, gait, and endurance  Z74.09           Surgery: Left Knee Medial Patellofemoral Ligament Reconstruction Hamstring Autograft And Partial Patellectomy/ Lateral Retinacular Lengthening - Left  Date: 3/30/23  Therapy precautions: Rehab protocol: Routine MPFL protocol, motion as tolerated, gradual progression of weightbearing as tolerated in extension  Co-morbidities affecting plan of care: none  Patient Stated Goals: return to full activity including walking, exercise    Total Timed Procedure Codes: 50 min, Total Time: 75 min Modifier needed: No  Episode visit count:  18     SUBJECTIVE     Pt has been walking with unlocked brace for all ambulation and using 2 crutches. OBJECTIVE     Therapeutic exercise (14734) x 40 min to develop ROM, strength, endurance and flexibility of surgical knee. Upright bike for ROM w/ partial revolutions x 10 min w/ 3 holes showing  Bilateral leg press 70# 3x10  Wall slides to increase knee flexion w/ 3# at ankle H1min x 3  Passive L knee flexion sitting and supine  Modified cathie stretch w/ passive knee flexion  Knee flexion stretch on second step H10sec x 10  Alternating toe taps on 6-inch step to increase functional use of ROM  Blood Flow Restriction Training:  Provided verbal consent for use of BFR after being educated on treatment, expectations, and post-treatment instructions.    BFR contraindications: Reviewed- none noted  Cuff size and Location medium  L thigh   Arterial Occlusion Pressure 70% of 170 mmHG (119 mmHg)   Therapeutic Exercises: [id number]  30\" rest between sets Supine L SLR
